# Patient Record
Sex: MALE | Race: WHITE | Employment: FULL TIME | URBAN - METROPOLITAN AREA
[De-identification: names, ages, dates, MRNs, and addresses within clinical notes are randomized per-mention and may not be internally consistent; named-entity substitution may affect disease eponyms.]

---

## 2019-10-09 ENCOUNTER — OFFICE VISIT (OUTPATIENT)
Dept: UROLOGY | Facility: CLINIC | Age: 39
End: 2019-10-09
Payer: COMMERCIAL

## 2019-10-09 VITALS — HEART RATE: 72 BPM | SYSTOLIC BLOOD PRESSURE: 156 MMHG | DIASTOLIC BLOOD PRESSURE: 94 MMHG | OXYGEN SATURATION: 97 %

## 2019-10-09 DIAGNOSIS — Z31.41 FERTILITY TESTING: Primary | ICD-10-CM

## 2019-10-09 LAB — FSH SERPL-ACNC: 2 IU/L (ref 0.7–10.8)

## 2019-10-09 PROCEDURE — 84270 ASSAY OF SEX HORMONE GLOBUL: CPT | Performed by: UROLOGY

## 2019-10-09 PROCEDURE — 36415 COLL VENOUS BLD VENIPUNCTURE: CPT | Performed by: UROLOGY

## 2019-10-09 PROCEDURE — 99203 OFFICE O/P NEW LOW 30 MIN: CPT | Performed by: UROLOGY

## 2019-10-09 PROCEDURE — 84403 ASSAY OF TOTAL TESTOSTERONE: CPT | Performed by: UROLOGY

## 2019-10-09 PROCEDURE — 82670 ASSAY OF TOTAL ESTRADIOL: CPT | Performed by: UROLOGY

## 2019-10-09 PROCEDURE — 83001 ASSAY OF GONADOTROPIN (FSH): CPT | Performed by: UROLOGY

## 2019-10-09 ASSESSMENT — PAIN SCALES - GENERAL: PAINLEVEL: NO PAIN (0)

## 2019-10-09 NOTE — NURSING NOTE
Leo Hannah's goals for this visit include:   Chief Complaint   Patient presents with     Infertililty     consult for infertility       He requests these members of his care team be copied on today's visit information: No    PCP: No Ref-Primary, Physician    Referring Provider:  No referring provider defined for this encounter.    BP (!) 156/94 (BP Location: Left arm, Patient Position: Sitting, Cuff Size: Adult Regular)   Pulse 72   SpO2 97%     Do you need any medication refills at today's visit? No    Angelita Conte LPN

## 2019-10-09 NOTE — LETTER
10/9/2019       RE: Leo Hannah  [address] [place] [id number]     Dear Colleague,    Thank you for referring your patient, Leo Hannah, to the [facility]. Please see a copy of my visit note below.    Dear Dr. Ward, it was my pleasure to see Mr. Leo Hannah, a 39 year old male here in consultation today for fertility evaluation.  His spouse is Itzel Hannah age 31 (4/8//88).    This couple has been attempting to conceive for the last 20 months. They have no previous pregnancy together.  Pregnancies with other partners: he has 2 boys age 10 and 11, conceived easily..  They have tried timed intercourse. They have not tried IUI or IVF.    Female factors suspected: None known.  Cycles are  Regular.  Normal HSG and US.  Ovulation is later in the cycle.    PAST MEDICAL HISTORY:    No chronic medical problems   Remote history of ear infections.   Puberty normal   No associated conditions such as ED or sexual dysfunction.  No  problems.     PAST SURG HISTORY  Ear surgery 12 yo.    Medications as of 10/9/2019:  No current outpatient medications on file.     No current facility-administered medications for this visit.       ALLERGY:   No Known Allergies    SOCIAL HISTORY: . Occupation: .  Alcohol can vary a lot,  tobacco use.  Advised < 7 alcohol per week, no more than 1-2/day.   Social History     Tobacco Use     Smoking status: Never Smoker     Smokeless tobacco: Never Used   Substance Use Topics     Alcohol use: None     Drug use: None       FAMILY HISTORY:  Mother history of ovarian cancer.  pgm ovarian cancer.  His half brother may have some fertility issues.    REVIEW OF SYSTEMS:  Significant for feeling well at present .    Denies erectile dysfunction, ejaculatory problems, testicular pain. No vision or smell deficits, no chronic sinus or respiratory infections. No recent febrile illness, weight loss. No heat or  cold intolerance, gynecomastia, or other endocrine complaints.    Otherwise, no constitutional, eye, ENT, heart, lung, GI , musculoskeletal, skin, neurologic, psychiatric, or hematologic complaints.    GONADOTOXIN EXPOSURE: Occasionally heavy alcohol. Otherwise negative for marijuana, heat, chemicals, pesticides, heavy metals, steroids, chemotherapy or radiation.    GENERAL PHYSICAL EXAM  BP (!) 156/94 (BP Location: Left arm, Patient Position: Sitting, Cuff Size: Adult Regular)   Pulse 72   SpO2 97%    Constitutional: No acute distress. Well nourished.   PSYCH: normal mood and affect.  NEURO: normal gait, no focal deficits.   EYES: anicteric, EOMI, PERR  ENT: neck supple,  mucosae moist, no thrush.  CARDIOPULMONARY: breathing non-labored, pulse regular, no peripheral edema.  GI: Abdomen soft, non-tender, no  surgical scars, no organomegaly.  MUSCULOSKELETAL: normal limb proportions, no muscle wasting, no contractures.  SKIN: Normal virilized hair distribution, no lesions, warts or rashes over genitalia, abdomen extremities or face.  HEME/LYMPH: no ecchymosis, no lymphadenopathy in groin or neck, no lymphedema.     EXAM:  Phallus circumcised, meatus adequate, no plaques palpated. Small wart or skin tag at dorsal penile base area  Left testis descended , size is 18 cc , consistency is normal . No intra-testicular masses.   Right testis descended , size is 18 cc , consistency is normal . No intra-testicular masses.   Epididymes present, non-tender, not enlarged.   Left cord: Vas present. No varicocele noted.  Right cord: Vas present. No varicocele noted.     Rectal exam deferred.       ASSESSMENT:    Fertility Testing.    No varicocele noted    PLAN:    Hormonal panel    Semen analysis x 1-2    FertilAid for Men recommended.    Discussed assisted reproductive technology such as IUI or IVF.    I will contact him with results and plan/options.    Thank-you for allowing me to care for your  patient.  Sincerely,    Vaughn Nathan MD      CC: Sylvia    Again, thank you for allowing me to participate in the care of your patient.      Sincerely,    Vaughn Nathan MD

## 2019-10-09 NOTE — PATIENT INSTRUCTIONS
[facility]  [facility]  [address]  ?[address] [place] [address]  [phone]

## 2019-10-09 NOTE — PROGRESS NOTES
Dear Dr. Wrad, it was my pleasure to see . Leo Hannah, a 39 year old male here in consultation today for fertility evaluation.  His spouse is Itzel Hannah age 31 (4/8//88).    This couple has been attempting to conceive for the last 20 months. They have no previous pregnancy together.  Pregnancies with other partners: he has 2 boys age 10 and 11, conceived easily..  They have tried timed intercourse. They have not tried IUI or IVF.    Female factors suspected: None known.  Cycles are  Regular.  Normal HSG and US.  Ovulation is later in the cycle.    PAST MEDICAL HISTORY:    No chronic medical problems   Remote history of ear infections.   Puberty normal   No associated conditions such as ED or sexual dysfunction.  No  problems.     PAST SURG HISTORY  Ear surgery 12 yo.    Medications as of 10/9/2019:  No current outpatient medications on file.     No current facility-administered medications for this visit.       ALLERGY:   No Known Allergies    SOCIAL HISTORY: . Occupation: .  Alcohol can vary a lot,  tobacco use.  Advised < 7 alcohol per week, no more than 1-2/day.   Social History     Tobacco Use     Smoking status: Never Smoker     Smokeless tobacco: Never Used   Substance Use Topics     Alcohol use: None     Drug use: None       FAMILY HISTORY:  Mother history of ovarian cancer.  pgm ovarian cancer.  His half brother may have some fertility issues.    REVIEW OF SYSTEMS:  Significant for feeling well at present .    Denies erectile dysfunction, ejaculatory problems, testicular pain. No vision or smell deficits, no chronic sinus or respiratory infections. No recent febrile illness, weight loss. No heat or cold intolerance, gynecomastia, or other endocrine complaints.    Otherwise, no constitutional, eye, ENT, heart, lung, GI , musculoskeletal, skin, neurologic, psychiatric, or hematologic complaints.    GONADOTOXIN EXPOSURE: Occasionally heavy alcohol. Otherwise negative for marijuana,  heat, chemicals, pesticides, heavy metals, steroids, chemotherapy or radiation.    GENERAL PHYSICAL EXAM  BP (!) 156/94 (BP Location: Left arm, Patient Position: Sitting, Cuff Size: Adult Regular)   Pulse 72   SpO2 97%    Constitutional: No acute distress. Well nourished.   PSYCH: normal mood and affect.  NEURO: normal gait, no focal deficits.   EYES: anicteric, EOMI, PERR  ENT: neck supple,  mucosae moist, no thrush.  CARDIOPULMONARY: breathing non-labored, pulse regular, no peripheral edema.  GI: Abdomen soft, non-tender, no  surgical scars, no organomegaly.  MUSCULOSKELETAL: normal limb proportions, no muscle wasting, no contractures.  SKIN: Normal virilized hair distribution, no lesions, warts or rashes over genitalia, abdomen extremities or face.  HEME/LYMPH: no ecchymosis, no lymphadenopathy in groin or neck, no lymphedema.     EXAM:  Phallus circumcised, meatus adequate, no plaques palpated. Small wart or skin tag at dorsal penile base area  Left testis descended , size is 18 cc , consistency is normal . No intra-testicular masses.   Right testis descended , size is 18 cc , consistency is normal . No intra-testicular masses.   Epididymes present, non-tender, not enlarged.   Left cord: Vas present. No varicocele noted.  Right cord: Vas present. No varicocele noted.     Rectal exam deferred.       ASSESSMENT:    Fertility Testing.    No varicocele noted    PLAN:    Hormonal panel    Semen analysis x 1-2    FertilAid for Men recommended.    Discussed assisted reproductive technology such as IUI or IVF.    I will contact him with results and plan/options.    Thank-you for allowing me to care for your patient.  Sincerely,    Vaughn Nathan MD      CC: Sylvia

## 2019-10-12 LAB
SHBG SERPL-SCNC: 20 NMOL/L (ref 11–80)
TESTOST FREE SERPL-MCNC: 5.17 NG/DL (ref 4.7–24.4)
TESTOST SERPL-MCNC: 209 NG/DL (ref 240–950)

## 2019-10-15 LAB — ESTRADIOL SERPL HS-MCNC: 28 PG/ML (ref 10–40)

## 2019-10-25 DIAGNOSIS — Z31.41 FERTILITY TESTING: Primary | ICD-10-CM

## 2019-10-26 DIAGNOSIS — Z31.41 FERTILITY TESTING: ICD-10-CM

## 2019-10-26 LAB
FERRITIN SERPL-MCNC: 87 NG/ML (ref 26–388)
LH SERPL-ACNC: 3.1 IU/L (ref 1.5–9.3)
PROLACTIN SERPL-MCNC: 7 UG/L (ref 2–18)

## 2019-10-26 PROCEDURE — 82728 ASSAY OF FERRITIN: CPT | Performed by: UROLOGY

## 2019-10-26 PROCEDURE — 83002 ASSAY OF GONADOTROPIN (LH): CPT | Performed by: UROLOGY

## 2019-10-26 PROCEDURE — 84146 ASSAY OF PROLACTIN: CPT | Performed by: UROLOGY

## 2019-10-26 PROCEDURE — 84403 ASSAY OF TOTAL TESTOSTERONE: CPT | Performed by: UROLOGY

## 2019-10-26 PROCEDURE — 36415 COLL VENOUS BLD VENIPUNCTURE: CPT | Performed by: UROLOGY

## 2019-10-26 NOTE — LETTER
November 6, 2019       TO: Leo Hannah  [address] [place] [id number]       Dear ,    We are writing to inform you of your test results.  Testosterone was within normal limits.   Lutropin, prolactin and ferritin are proteins/hormones that help regulate testosterone level, these are all normal also.   Testosterone is in the lower 1/2 of the normal range.   We tend to see lower testosterone in guys that carry extra weight- so weight loss would probably help boost that number.     I recommend cutting out alcohol as well.     Next step would be do schedule the semen analysis if not already scheduled, and I'll contact you with results,.       Resulted Orders   Prolactin   Result Value Ref Range    Prolactin 7 2 - 18 ug/L   Ferritin   Result Value Ref Range    Ferritin 87 26 - 388 ng/mL   Lutropin   Result Value Ref Range    Lutropin 3.1 1.5 - 9.3 IU/L   Testosterone total   Result Value Ref Range    Testosterone Total 305 240 - 950 ng/dL      Comment:      This test was developed and its performance characteristics determined by the   [facility],  Special [facility] Laboratory. It has   not been cleared or approved by the FDA. The laboratory is regulated under   CLIA as qualified to perform high-complexity testing. This test is used for   clinical purposes. It should not be regarded as investigational or for   research.         Vaughn Nathan MD

## 2019-10-29 LAB — TESTOST SERPL-MCNC: 305 NG/DL (ref 240–950)

## 2019-11-05 NOTE — RESULT ENCOUNTER NOTE
Mina Melgar,    Can you please send the patient these normal results?    Testosterone was within normal limits.  Lutropin, prolactin and ferritin are proteins/hormones that help regulate testosterone level, these are all normal also.  Testosterone is in the lower 1/2 of the normal range.  We tend to see lower testosterone in guys that carry extra weight- so weight loss would probably help boost that number.    I recommend cutting out alcohol as well.    Next step would be do schedule the semen analysis if not already scheduled, and I'll contact you with results,.      Thanks,    Ernestina CHERRY

## 2019-11-08 ENCOUNTER — TELEPHONE (OUTPATIENT)
Dept: UROLOGY | Facility: CLINIC | Age: 39
End: 2019-11-08

## 2019-11-08 NOTE — TELEPHONE ENCOUNTER
[facility] Call Center    Phone Message    May a detailed message be left on voicemail: yes    Reason for Call: Requesting Results   Name/type of test: lab  Date of test: 10/26  Was test done at a location other than [facility] (Please fill in the location if not [facility])?: No      Action Taken: Message routed to:  Adult Clinics: Urology p [id number]

## 2019-11-08 NOTE — TELEPHONE ENCOUNTER
Returned call to patient who was informed of the 10/26/19 lab results, result note, and recommendations from Dr. Nathan. Patient verbalized understanding and is aware that a letter was mailed to his home.    Carla Franco RN, BSN

## 2019-11-29 DIAGNOSIS — Z31.41 FERTILITY TESTING: ICD-10-CM

## 2019-11-29 PROCEDURE — 89322 SEMEN ANAL STRICT CRITERIA: CPT

## 2019-11-29 NOTE — LETTER
"    December 23, 2019       TO: Leo Hannah  [address] [place] [id number]     Dear Leo,       Semen analysis shows very good number of sperm and good motility. Low sperm morphology was seen.     There is a lot of debate among fertility specialists about the clinical usefulness of morphology (shape) measurement.   Some people think it is a very important measure. Others, like me, find that it's not a very helpful measure because almost everyone I see has a low morphology, so the test loses meaning for me for the most part, unless one particular bad factor was seen like round-headed sperm or headless sperm. Taken in isolation, I don't think strict morphology is a very strong predictor for fertility or not.  Literature supports and refutes the usefulness of morphology in different studies.  In one study, men with 0-4% morphology had more pregnancies than couples with  >14% morphology.  This tells us morphology may not be a good predictor for fertility chances, really.     Out of the millions of sperm in the sample, the lab looks at about 200 to estimate the morphology.  If they count 8 that are strictly normal by the strictest measures, and not abnormal in any one category ( head size, head shape, nose shape, midpiece, straight tail, etc) then you have a \"normal\" sample with 4% strictly normal morphology.  If they count 6 normal ones, you have 3% normal morphology, etc.  It is just SINGLE DIGIT numbers they are counting to estimate the morphology, it is not a thorough sampling, really, just an estimate.  I pretty rarely see normal morphology over 6% in anyone.     There is no particular therapy for isolated low morphology.  I recommend over-the-counter fertility supplements, though the evidence these help a ton is slim.  Otherwise lifestyle modifications: stop smoking, weight loss, avoid excess alcohol, avoid heat exposure, and such.     Your options are to pursue IUI (intrauterine inseminations) with a " female fertility specialist, and move to IVF if this is not successful. Certainly keep trying on your own.     Recommended over-the-counter supplements/antioxidants that may help with quantity/quality of sperm production:     1. Vitamin C 500mg/day   2. L-carnitine 500mg/day   3. CoQ10 200mg/day   4. Multivitamin daily     One I like is FertilAid for Men, it has most of these in one supplement.     There are 4 clinics in the [facility] that do all aspects of advanced female infertility care:   1. [facility]ductive [facility] - [place]. [email or web address]. Office in [place].   2. [facility] Reproductive Medicine [email or web address] Offices in [facility].   3. [facility] Reproductive Health www.[facility]. Office in [place].   4. [employer] [email or web address]. Office in [facility].     Thank You   Let me know if you have any questions.  I'm happy to see you back to discuss results in depth.       Resulted Orders   Semen Analysis, Strict Morphology (FREDDIE)   Result Value Ref Range    Collection Method Masturbation     Collection Site FREDDIE     Specimen Type Semen     Lab Receipt Time 10:20 AM     Time of Analysis 10:35 AM     Analysis Temp - Centigrade 22 centigrade    Abstinence days 7 2 - 7 days    Liquefied Yes yes/no    Viscous No yes/no    Agglutination No yes/no    pH 7.6 7.2 pH    Volume 10.7 1.5 ml    Concentration 24 15 million/ml    Total Number 257 39 million    Progressive motility 47 32 %    Non-progressive motility 7 %    Immotile 46 %    Total Progressive Motile 121 15.6 million    Vitality . 58 %    Normal Sperm 1 (A) 4 % normal forms    Abnormal Sperm 99 morphology    Head Defect 100     Midpiece Defect 78       Comment:      Increased incidence of bent necks.    Tail Defect 19       Comment:      Increased incidence of coiled tails.    Round Cells 0.2 2 million/ml    WBC . %    Immature Sperm . %    Cell Fragments . %    Consent to Release to Partner Yes     Narrative     Heavy lipid background     Vaughn Nathan MD

## 2019-12-02 LAB
ABNORMAL SPERM: 99 MORPHOLOGY
ABSTINENCE DAYS: 7 DAYS (ref 2–7)
AGGLUTINATION: NO YES/NO
ANALYSIS TEMP - CENTIGRADE: 22 CENTIGRADE
CELL FRAGMENTS: ABNORMAL %
COLLECTION METHOD: ABNORMAL
COLLECTION SITE: ABNORMAL
CONSENT TO RELEASE TO PARTNER: YES
HEAD DEFECT: 100
IMMATURE SPERM: ABNORMAL %
IMMOTILE: 46 %
LAB RECEIPT TIME: ABNORMAL
LIQUEFIED: YES YES/NO
MIDPIECE DEFECT: 78
NON-PROGRESSIVE MOTILITY: 7 %
NORMAL SPERM: 1 % NORMAL FORMS (ref 4–?)
PROGRESSIVE MOTILITY: 47 % (ref 32–?)
ROUND CELLS: 0.2 MILLION/ML (ref ?–2)
SPECIMEN CONCENTRATION: 24 MILLION/ML (ref 15–?)
SPECIMEN PH: 7.6 PH (ref 7.2–?)
SPECIMEN TYPE: ABNORMAL
SPECIMEN VOL UR: 10.7 ML (ref 1.5–?)
TAIL DEFECT: 19
TIME OF ANALYSIS: ABNORMAL
TOTAL NUMBER: 257 MILLION (ref 39–?)
TOTAL PROGRESSIVE MOTILE: 121 MILLION (ref 15.6–?)
VISCOUS: NO YES/NO
VITALITY: ABNORMAL % (ref 58–?)
WBC SPECIMEN: ABNORMAL %

## 2019-12-11 NOTE — RESULT ENCOUNTER NOTE
"Please notify pt of these results:      Dear Leo,      Semen analysis shows very good number of sperm and good motility. Low sperm morphology was seen.    There is a lot of debate among fertility specialists about the clinical usefulness of morphology (shape) measurement.  Some people think it is a very important measure. Others, like me, find that it's not a very helpful measure because almost everyone I see has a low morphology, so the test loses meaning for me for the most part, unless one particular bad factor was seen like round-headed sperm or headless sperm. Taken in isolation, I don't think strict morphology is a very strong predictor for fertility or not.  Literature supports and refutes the usefulness of morphology in different studies.  In one study, men with 0-4% morphology had more pregnancies than couples with  >14% morphology.  This tells us morphology may not be a good predictor for fertility chances, really.    Out of the millions of sperm in the sample, the lab looks at about 200 to estimate the morphology.  If they count 8 that are strictly normal by the strictest measures, and not abnormal in any one category ( head size, head shape, nose shape, midpiece, straight tail, etc) then you have a \"normal\" sample with 4% strictly normal morphology.  If they count 6 normal ones, you have 3% normal morphology, etc.  It is just SINGLE DIGIT numbers they are counting to estimate the morphology, it is not a thorough sampling, really, just an estimate.  I pretty rarely see normal morphology over 6% in anyone.    There is no particular therapy for isolated low morphology.  I recommend over-the-counter fertility supplements, though the evidence these help a ton is slim.  Otherwise lifestyle modifications: stop smoking, weight loss, avoid excess alcohol, avoid heat exposure, and such.    Your options are to pursue IUI (intrauterine inseminations) with a female fertility specialist, and move to IVF if this is " not successful. Certainly keep trying on your own.     Recommended over-the-counter supplements/antioxidants that may help with quantity/quality of sperm production:    1. Vitamin C 500mg/day  2. L-carnitine 500mg/day  3. CoQ10 200mg/day  4. Multivitamin daily     One I like is FertilAid for Men, it has most of these in one supplement.    There are 4 clinics in the [facility] that do all aspects of advanced female infertility care:  1. [facility] for Reproductive [facility] - [place]. www.[email or web address]. Office in [place].  2. [facility] Reproductive Medicine [email or web address] Offices in [facility].   3. [facility] for Reproductive Health www.[facility]. Office in [place].  4. [employer] www.[email or web address]. Office in [facility].     Thank You  Let me know if you have any questions.  I'm happy to see you back to discuss results in depth.    Here is a nice morphology fact sheet: https://www.asrm.org/uploadedFiles/ASRM_Content/Resources/Patient_Resources/Fact_Sheets_and_Info_Booklets/SpermShape.pdf      - thanks.  Ernestina CHERRY

## 2019-12-12 ENCOUNTER — TELEPHONE (OUTPATIENT)
Dept: UROLOGY | Facility: CLINIC | Age: 39
End: 2019-12-12

## 2019-12-12 NOTE — LETTER
"        Mr.Robert SHAYY Hannah  [address] [place] [id number]        December 12, 2019      Dear Leo,      Semen analysis shows very good number of sperm and good motility. Low sperm morphology was seen.    There is a lot of debate among fertility specialists about the clinical usefulness of morphology (shape) measurement.  Some people think it is a very important measure. Others, like me, find that it's not a very helpful measure because almost everyone I see has a low morphology, so the test loses meaning for me for the most part, unless one particular bad factor was seen like round-headed sperm or headless sperm. Taken in isolation, I don't think strict morphology is a very strong predictor for fertility or not.  Literature supports and refutes the usefulness of morphology in different studies.  In one study, men with 0-4% morphology had more pregnancies than couples with  >14% morphology.  This tells us morphology may not be a good predictor for fertility chances, really.    Out of the millions of sperm in the sample, the lab looks at about 200 to estimate the morphology.  If they count 8 that are strictly normal by the strictest measures, and not abnormal in any one category ( head size, head shape, nose shape, midpiece, straight tail, etc) then you have a \"normal\" sample with 4% strictly normal morphology.  If they count 6 normal ones, you have 3% normal morphology, etc.  It is just SINGLE DIGIT numbers they are counting to estimate the morphology, it is not a thorough sampling, really, just an estimate.  I pretty rarely see normal morphology over 6% in anyone.    There is no particular therapy for isolated low morphology.  I recommend over-the-counter fertility supplements, though the evidence these help a ton is slim.  Otherwise lifestyle modifications: stop smoking, weight loss, avoid excess alcohol, avoid heat exposure, and such.    Your options are to pursue IUI (intrauterine inseminations) with a " female fertility specialist, and move to IVF if this is not successful. Certainly keep trying on your own.     Recommended over-the-counter supplements/antioxidants that may help with quantity/quality of sperm production:    1. Vitamin C 500mg/day  2. L-carnitine 500mg/day  3. CoQ10 200mg/day  4. Multivitamin daily     One I like is FertilAid for Men, it has most of these in one supplement.    There are 4 clinics in the [facility] that do all aspects of advanced female infertility care:  1. [facility] for Reproductive Medicine - [place]. [email or web address]. Office in [place].  2. [facility] Reproductive Medicine [email or web address] Offices in [place].   3. [facility] Reproductive Health www.[facility] PharmaStudies. Office in [place].  4. [employer] www.[employer]. Office in [facility].     Thank You  Let me know if you have any questions.  I'm happy to see you back to discuss results in depth.    Here is a nice morphology fact sheet: https://www.asrm.org/uploadedFiles/ASRM_Content/Resources/Patient_Resources/Fact_Sheets_and_Info_Booklets/SpermShape.pdf      - thanks.  Ernestina CHERRY       Results   Semen Analysis, Strict Morphology (FREDDIE) (Order [id number])   Component Value Ref Range & Units Status   Collection Method Masturbation   Final   Collection Site FREDDIE   Final   Specimen Type Semen   Final   Lab Receipt Time 10:20 AM   Final   Time of Analysis 10:35 AM   Final   Analysis Temp - Centigrade 22  centigrade Final   Abstinence days 7  2 - 7 days Final   Liquefied Yes  yes/no Final   Viscous No  yes/no Final   Agglutination No  yes/no Final   pH 7.6  7.2 pH Final   Volume 10.7  1.5 ml Final   Concentration 24  15 million/ml Final   Total Number 257  39 million Final   Progressive motility 47  32 % Final   Non-progressive motility 7  % Final   Immotile 46  % Final   Total Progressive Motile 121  15.6 million Final   Vitality .  58 % Final   Normal Sperm 1Abnormal   4 % normal forms Final   Abnormal  Sperm 99  morphology Final   Head Defect 100   Final   Midpiece Defect 78   Final   Comment:   Increased incidence of bent necks.   Tail Defect 19   Final   Comment:   Increased incidence of coiled tails.   Round Cells 0.2  2 million/ml Final   WBC .  % Final   Immature Sperm .  % Final   Cell Fragments .  % Final   Consent to Release to Partner Yes   Final

## 2019-12-12 NOTE — TELEPHONE ENCOUNTER
Called and spoke to patient who is aware of the 11/29/19 SA results and result note and recommendations from Dr. Nathan. Patient verbalized understanding. Patient's home address verified and results and result note mailed to patient. Informed patient to call with any questions.    Carla Franco RN, BSN

## 2019-12-12 NOTE — TELEPHONE ENCOUNTER
[facility] Call Center    Phone Message    May a detailed message be left on voicemail: yes    Reason for Call: Requesting Results   Patient called and requesting call back for lab results of 11-29 testing. Please call to discuss.       Action Taken: Message routed to:  Adult Clinics: Urology p [id number]

## 2020-02-10 ENCOUNTER — HEALTH MAINTENANCE LETTER (OUTPATIENT)
Age: 40
End: 2020-02-10

## 2020-11-16 ENCOUNTER — HEALTH MAINTENANCE LETTER (OUTPATIENT)
Age: 40
End: 2020-11-16

## 2021-04-04 ENCOUNTER — HEALTH MAINTENANCE LETTER (OUTPATIENT)
Age: 41
End: 2021-04-04

## 2021-09-18 ENCOUNTER — HEALTH MAINTENANCE LETTER (OUTPATIENT)
Age: 41
End: 2021-09-18

## 2022-04-30 ENCOUNTER — HEALTH MAINTENANCE LETTER (OUTPATIENT)
Age: 42
End: 2022-04-30

## 2022-11-20 ENCOUNTER — HEALTH MAINTENANCE LETTER (OUTPATIENT)
Age: 42
End: 2022-11-20

## 2023-06-01 ENCOUNTER — HEALTH MAINTENANCE LETTER (OUTPATIENT)
Age: 43
End: 2023-06-01